# Patient Record
(demographics unavailable — no encounter records)

---

## 2017-01-29 NOTE — US
EXAMINATION TYPE: US transvaginal

 

DATE OF EXAM: 1/27/2017 1:45 PM

 

COMPARISON: NONE

 

CLINICAL HISTORY: N92.1 METRORRHAGIA UNRELATED TO MENSTRUAL CYCLE.

 

TECHNIQUE:  Transvaginal (TV), as bladder was not full and patient stated scheduled for work today an
d chose TV rather than TAUS

 

Date of LMP:  12/26/2016

 

EXAM MEASUREMENTS:

 

Uterus:  8.8 x 6.1 x 4.3cm

Endometrial Stripe: 1.3cm

Right Ovary:  4.3 x 3.7 x 3.6cm

Left Ovary:  2.5 x 2.5 x 1.8cm

 

Findings:

 

1. Uterus:  Anteverted   C Section scar is noted in ROSS. Complex Nabothian cyst is present in CX = 0.
5 x 0.4 x 0.4cm.

2. Endometrium:   unable to correlate thickness with LMP as patient stated has been on menses continu
ally since 12/21/2016  

3. Right Ovary:  enlarged with large simple cyst = 3.5 x 3.0 x 3.2cm 

4. Left Ovary:  multifollicular wit largest = 1.0 x 1.1 x 0.7cm 

**Spectral, color and waveform Doppler imaging shows good arterial and venous flow within the ovaries
; there is no evidence for ovarian torsion.

5. Bilateral Adnexa:  wnl

6. Posterior cul-de-sac:  wnl

 

 

IMPRESSION: 

1. Limited assessment of the endometrium is discussed above. Measures 1.3 cm and appears thickened. C
orrelate clinically for endometrial pathology.

2. Simple appearing right ovarian cyst measuring 3.5 cm.

## 2017-02-07 NOTE — XR
EXAMINATION TYPE: XR foot limited RT

 

DATE OF EXAM: 2/7/2017 6:49 PM

 

COMPARISON: NONE

 

HISTORY: Injury

 

TECHNIQUE: 2 views

 

FINDINGS: I see no fracture nor dislocation. Metatarsals are intact. There is a small Achilles calcan
eal spur. There are no erosions.

 

IMPRESSION: Mild calcaneal spurring. Otherwise negative exam.
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
## 2017-02-07 NOTE — XR
EXAMINATION TYPE: XR chest 2V

 

DATE OF EXAM: 2/7/2017 6:49 PM

 

COMPARISON: 4/14/2016

 

HISTORY: Chest pain

 

TECHNIQUE:  Frontal and lateral views of the chest are obtained.

 

FINDINGS:  Heart and mediastinum are normal. Lungs are clear. Costophrenic angles are clear. There ar
e no hilar masses. Bony thorax is intact.

 

IMPRESSION:  Normal chest. No change.

## 2017-02-17 NOTE — ED
General Adult HPI





- General


Chief complaint: Nausea/Vomiting/Diarrhea


Stated complaint: Nausea


Time Seen by Provider: 17 16:50


Source: patient, RN notes reviewed


Mode of arrival: ambulatory


Limitations: no limitations





- History of Present Illness


Initial comments: 


This is a 39-year-old female who presents with nausea that started last night.  

Patient states she's had 4 episodes of emesis since last night.  Patient states 

her  has the same symptoms at home.  Patient has not had an episode of 

emesis since.  Patient denies any hematemesis or hematochezia or hematuria.  

Patient denies any dysuria.  Patient states she's had a mild case of diarrhea 

once since last night.  Patient denies any abdominal pain.  Patient denies any 

chance of being pregnant as she has had a tubal ligation. Patient denies any 

cough, congestion, or sore throat but admits to a mild headache.  Patient 

attributes her headache to a lack of caffeine today.  Patient has been drinking 

fluids. Patient denies any recent fever, chills, shortness breath, chest pain, 

back pain, numbness, tingling,  hematuria, or visual changes, or any other 

complaints.








- Related Data


 Home Medications











 Medication  Instructions  Recorded  Confirmed


 


ARIPiprazole [Abilify] 2.5 mg PO DAILY 03/03/15 02/07/17


 


Citalopram Hydrobromide [CeleXA] 20 mg PO DAILY 03/03/15 02/07/17


 


Dextroamphetamine/Amphetamine 30 mg PO BID 17





[Adderall]   








 Previous Rx's











 Medication  Instructions  Recorded


 


Ibuprofen [Motrin] 600 mg PO Q6HR PRN #20 tab 17


 


Ondansetron Odt [Zofran Odt] 4 mg PO Q8H 3 Days 17


 


Sulfamethox-Tmp 800-160Mg [Bactrim 1 tab PO Q12HR #28 tab 17





-160 mg]  











 Allergies











Allergy/AdvReac Type Severity Reaction Status Date / Time


 


Penicillins AdvReac  Itching Verified 17 18:21














Review of Systems


ROS Statement: 


Those systems with pertinent positive or pertinent negative responses have been 

documented in the HPI.





ROS Other: All systems not noted in ROS Statement are negative.





Past Medical History


Past Medical History: No Reported History


History of Any Multi-Drug Resistant Organisms: MRSA


Date of last positivie culture/infection: 


MDRO Source:: neck


Past Surgical History:  Section


Past Psychological History: Bipolar, Depression


Smoking Status: Current every day smoker


Past Alcohol Use History: Occasional


Past Drug Use History: None Reported





General Exam





- General Exam Comments


Initial Comments: 


General:  The patient is awake and alert, in no distress, and does not appear 

acutely ill. 


Eye:  Pupils are equal, round and reactive to light, extra-ocular movements are 

intact.  No nystagmus.  There is normal conjunctiva bilaterally.  No signs of 

icterus.  


Ears: TMs pink and pearly with intact cone of light bilaterally.  Normal 

external ear canals


Nose: Nasal turbinates pink and moist


Mouth and throat:  There are moist mucous membranes and no oral lesions. 


Neck:  The neck is supple, there is no tenderness or JVD.  


Cardiovascular:  There is a regular rate and rhythm. No murmur, rub or gallop 

is appreciated.


Respiratory:  Lungs are clear to auscultation, respirations are non-labored, 

breath sounds are equal.  No wheezes, stridor, rales, or rhonchi.


Gastrointestinal: Mild discomfort to palpation of the suprapubic area. Soft, non

-distended, abdomen without masses or organomegaly noted. There is no rebound 

or guarding present.  No CVA tenderness. Bowel sounds are unremarkable.


Musculoskeletal:  Normal ROM, no tenderness.  Strength 5/5. Sensation intact.  

Radial Pulses equal bilaterally 2+.  


Neurological:  A&O x 3. CN II-XII intact, There are no obvious motor or sensory 

deficits. Coordination appears grossly intact. Speech is normal.


Skin:  Skin is warm and dry and no rashes or lesions are noted. 


Psychiatric:  Cooperative, appropriate mood & affect, normal judgment.  





Limitations: no limitations





Course


 Vital Signs











  17





  16:24


 


Temperature 97.2 F L


 


Pulse Rate 76


 


Respiratory 17





Rate 


 


Blood Pressure 132/85


 


O2 Sat by Pulse 97





Oximetry 














Medical Decision Making





- Medical Decision Making


This is an 29yo  female who presents with nausea that started last night.  On 

physical exam patient is afebrile in the EC.  Mild discomfort to palpation of 

the suprapubic area.  Abdomen is soft, nondistended with no CVA tenderness.  No 

rebound guarding or rigidity.  A UA was checked.  An influenza was checked and 

was negative.  Patient was given Zofran in the EC today.  Patient was feeling 

much better after Zofran.  Urinalysis was positive for urinary tract infection.

  Blood was noted in the urine and with further questioning patient states she 

is currently finishing up her menstrual cycle. Patient denies any flank pain or 

back pain.  Discussed that patient would be put on a course of Bactrim.  I 

discussed that patient should drink plenty of fluids.  Discussed return 

parameters.  Tylenol or Motrin for any pain.  Discussed patient will be given a 

prescription for Zofran. Discussed that patient should follow up with PCP in 

one to 2 days or return to the EC for any worsening symptoms or for any further 

concerns.  Patient was receptive to this plan and patient will be discharged 

home.  








- Lab Data


 Lab Results











  17 Range/Units





  17:04 17:04 


 


Urine Color   Yellow  


 


Urine Appearance   Cloudy H  (Clear)  


 


Urine pH   7.0  (5.0-8.0)  


 


Ur Specific Gravity   1.016  (1.001-1.035)  


 


Urine Protein   Negative  (Negative)  


 


Urine Glucose (UA)   Negative  (Negative)  


 


Urine Ketones   Negative  (Negative)  


 


Urine Blood   Moderate H  (Negative)  


 


Urine Nitrate   Negative  (Negative)  


 


Urine Bilirubin   Negative  (Negative)  


 


Urine Urobilinogen   <2.0  (<2.0)  mg/dL


 


Ur Leukocyte Esterase   Large H  (Negative)  


 


Urine RBC   5  (0-5)  /hpf


 


Urine WBC   40 H  (0-5)  /hpf


 


Ur Squamous Epith Cells   9 H  (0-4)  /hpf


 


Urine Sperm   Rare  (None)  /hpf


 


Influenza Type A RNA  Not Detected   (Not Detectd)  


 


Influenza Type B (PCR)  Not Detected   (Not Detectd)  














Disposition


Clinical Impression: 


 Nausea & vomiting, Urinary tract infection





Disposition: HOME SELF-CARE


Condition: Good


Instructions:  Acute Nausea and Vomiting (ED), Urinary Tract Infection in Women 

(ED)


Additional Instructions: 


Please use Zofran as prescribed.  Please finish entire course of antibiotics.  

Please drink plenty of fluids. Please follow-up with family doctor in the next 

2 days of symptoms have not improved.  Please return to emergency room if the 

symptoms increase or worsen or for any other concerns.


Prescriptions: 


Ondansetron Odt [Zofran Odt] 4 mg PO Q8H 3 Days


Sulfamethox-Tmp 800-160Mg [Bactrim -160 mg] 1 tab PO Q12HR #28 tab


Referrals: 


Alejo Reece MD [Primary Care Provider] - 1-2 days


Time of Disposition: 17:58

## 2017-02-28 NOTE — P.HPIM
History of Present Illness


H&P Date: 17


Chief Complaint: Abdominal pain vaginal bleeding heavy menses





A 39-year-old female who was a direct admission from Dr. Scales's office on 

.  Patient was being seen in the office for chief complaint of 

developing abdominal pain bilateral lower quadrants with heavy menses onset 2 

months prior reportedly has become more symptomatic interfering with activities 

of daily life.  Patient stated that she had an vaginal ultrasound done in the 

outpatient setting as part of a workup for the heavy menses this was done in 

2017.  Patient is not certain of what the findings were.  Reviewing 

computerized medical records indicate on 2017 ultrasound vaginally 

showed a simple appearing right ovarian cyst measuring 3.5 cm.  Patient also 

states that she has not had prior episodes of having heavy vaginal.  Patient 

gives no significant past surgical history when questioning.  Additionally 

patient states she has not been to an OB/GYN.  Her last pregnancy was 8 years 

ago according to the patient was uneventful.  Patient states prior to the last 

several months her periods had been normal with a normal vaginal flow patient 

states that she stands up she feels like she could pass clots with a lot of 

vaginal bleeding hemoglobin on admission was 13.





Review of Systems





Essentially unremarkable except as mentioned in the present illness





Past Medical History


Past Medical History: No Reported History


Additional Past Medical History / Comment(s): VAGINAL BLEEDING SINCE , 

ULTRASOUND DONE AND SHOWED CYST


History of Any Multi-Drug Resistant Organisms: MRSA


Date of last positivie culture/infection: 


MDRO Source:: neck


Past Surgical History:  Section


Past Anesthesia/Blood Transfusion Reactions: No Reported Reaction


Past Psychological History: Bipolar, Depression


Smoking Status: Current every day smoker


Past Alcohol Use History: Occasional


Past Drug Use History: None Reported





Medications and Allergies


 Home Medications











 Medication  Instructions  Recorded  Confirmed  Type


 


ARIPiprazole [Abilify] 2.5 mg PO HS 03/03/15 02/27/17 History


 


Citalopram Hydrobromide [CeleXA] 20 mg PO BID 03/03/15 02/27/17 History


 


Dextroamphetamine/Amphetamine 30 mg PO BID 17 History





[Adderall]    











 Allergies











Allergy/AdvReac Type Severity Reaction Status Date / Time


 


Penicillins Allergy  Itching Verified 17 20:11














Physical Exam


Vitals: 


 Vital Signs











  Temp Pulse Resp BP Pulse Ox


 


 17 12:29   59 L  18  


 


 17 08:00   59 L  18  


 


 17 07:42  97.6 F  59 L  18  107/70  96


 


 17 05:29    16  


 


 17 03:55  97.8 F  53 L  16  114/68  96


 


 17 00:00    16  


 


 17 19:53  98.3 F  81  16  124/71  97


 


 17 15:51  97.8 F  81  18  137/100  100








 Intake and Output











 17





 06:59 14:59 22:59


 


Intake Total  480 


 


Balance  480 


 


Intake:   


 


  Oral  480 


 


Other:   


 


  # Voids 1 2 














GENERAL APPEARANCE: 39-year-old female patient is alert, oriented, in no acute 

distress.  Resting in bed appears well-hydrated nontoxic


VITAL SIGNS: Reviewed afebrile


HEENT: Head is normocephalic and atraumatic. Pupils are equal and reactive. The 

nares are patent. Oropharynx is clear without lesions.


NECK: Supple without lymphadenopathy. Traches midline.


HEART: S1, S2. Regular rate and rhythm.  No murmur noted denying chest pain


LUNGS: No crackles or wheezes are heard.  Adequate air movement bilaterally on 

room air sats are 96% no cough


ABDOMEN: Soft, obese nontender, nondistended with good bowel sounds. No 

peritoneal signs. No palpable organomegaly or masses.  No facial grimacing with 

palpitation to the abdominal wall


Genitals no vaginal bleeding noted no swelling noted no vaginal discharge noted


EXTREMITIES: Normal skin color and turgor. No cyanosis, rash, ulceration, 

clubbing or edema. Radial pedal pulses are 2/4 bilaterally.


NEUROLOGICAL: No focal deficits. Strength and sensation are grossly intact.








Results


CBC & Chem 7: 


 17 09:01





 17 09:01


Labs: 


 Abnormal Lab Results - Last 24 Hours (Table)











  17 Range/Units





  09:01 09:01 


 


RBC  3.76 L   (3.80-5.40)  m/uL


 


Potassium   3.2 L  (3.5-5.1)  mmol/L


 


Glucose   125 H  (74-99)  mg/dL


 


Calcium   8.1 L  (8.4-10.2)  mg/dL


 


Total Protein   5.7 L  (6.3-8.2)  g/dL


 


Albumin   3.2 L  (3.5-5.0)  g/dL














Thrombosis Risk Factor Assmnt





- Choose All That Apply


Any of the Below Risk Factors Present?: No


Other Risk Factors: No


Other congenital or acquired thrombophilia - If yes, enter type in comment: No


Thrombosis Risk Factor Assessment Level: Very Low Risk





Assessment and Plan


Plan: 





Impression


Present on admission bilateral lower abdominal pain with vaginal bleeding 

unclear etiology


Depressive anxiety disorder nonspecified


A mood disorder suspect bipolar


2017 vaginal ultrasound done showing a simple appearing right ovarian 

cyst measuring 3.5 cm


Current Every day smoker


Obesity BMI 32











Plan


Surgical consultation pending


OB/GYN vaginal bleeding pending


Resume home meds as appropriate


Follow up on the ultrasound of the abdomen pending


Continue with the nicotine patch as ordered


Patient's been advised to stop smoking cigarettes


Further recommendations pending

















The above dictated assessment and findings were discussed with dr yordy Vargas and the plan of care have been dictated as directed.  Jeane Sanabria 

nurse practitioner acting as a scribe for dr scales

## 2017-02-28 NOTE — US
EXAMINATION TYPE: US abdomen complete

 

DATE OF EXAM: 2/28/2017 3:47 PM

 

COMPARISON: NONE

 

CLINICAL HISTORY: 39-year-old female with abdominal pain.

 

TECHNIQUE: Multiple sonographic images of the abdomen were obtained.

 

FINDINGS:

 

Liver Length:  17.8 cm   

Gallbladder Wall:  0.3 cm   

CBD: 4.5 mm

Spleen:  13.9 cm   

Right Kidney:  11.3 x 5.2 x 4.5  cm 

Left Kidney:  11.0 x 5.6 x 5.7 cm   

 

Pancreas:  echogenic, tail not seen due to overlying bowel gas, main pancreatic duct- 2.2 mm

Liver: Mildly enlarged and somewhat echogenic. No focal lesion seen.

Gallbladder:  No abnormal gallbladder distention, wall thickening, pericholecystic fluid, or shadowin
g calculi.

**Evidence for sonographic Brambila's sign:  neg

CHD:  Within normal limits. 

Spleen:  Borderline enlarged.   

Right Kidney: There is mild pelvicaliectasis.

Left Kidney:  There is mild pelvicaliectasis.

Upper IVC: Within normal limits.

Abd Aorta:  mid Aorta not seen due to overlying bowel gas

 

 

 

IMPRESSION: 

1. Mild hepatomegaly and suspected mild hepatic steatosis. Correlate with LFTs, lipid profile, and pa
tient risk factors.

2. Mild bilateral pelvicaliectasis/hydronephrosis.

## 2017-02-28 NOTE — US
EXAMINATION TYPE: US pelvis complete transvag

 

DATE OF EXAM: 2017 5:19 PM

 

COMPARISON: on PACS

 

CLINICAL HISTORY: 39-year-old female with abdominal pain and history of a right ovarian simple cyst, 
irregular bleeding.

 

Date of LMP:  2016,  

 

TECHNIQUE:  Transvaginal (TV) and Transabdominal (TA) 

 

FINDINGS:

Uterus:  Anteverted measuring 9.0 x 4.4 x 3.9 cm. A 6 mm cervical nabothian cyst is demonstrated.

 

Endometrial Stripe: 0.9 cm, within normal limits.

 

Right Ovary:  4.1 x 2.5 x 2.1 cm for a volume of 11.0 mL with follicular change.

 

Left Ovary:  3.0 x 2.0 x 1.6 cm for a volume of 5.0 mL with follicular change.

 

There is trace right adnexal free fluid adjacent to the ovary.

 

Otherwise, no evident adnexal abnormality or cul-de-sac free fluid.

 

 

 

IMPRESSION: 

1. Endometrial stripe now measuring 9 mm, within normal limits.

2. Follicular change in both ovaries. Previous 3.5 cm right ovarian cyst has resolved.

3. Trace pelvic free fluid adjacent to the right ovary likely physiologic.

## 2017-02-28 NOTE — P.GSCN
History of Present Illness


Consult date: 17


Reason for Consult: 


Abdominal pain





Requesting physician: Alejo Reece


History of present illness: 


Patient is a 39-year-old  female referred from Dr. Alejo Reece for 

abdominal pain.  Patient states that she's had right lower quadrant pain for 

the last 3 months increased in intensity over the last 4-5 days.  Patient 

describes pain as sharp, intermittent, and radiating to her back.  Patient 

currently rates pain 6 out of 10.  Patient also reports abnormal vaginal 

bleeding 3 months.  Patient states that she underwent a transvaginal 

ultrasound and Dr. Reece's office on  that showed evidence of a 

right ovarian cyst with evidence of thickened lining of the uterus.  Patient 

was instructed to follow-up with OB/GYN but states she has been unable to book 

an appointment.  Patient denies recent fevers, chills, nausea, vomiting, 

shortness of breath, or chest pain.  Patient states she has had 2 C-sections 

and tubal ligation in the past.  Patient denies constipation or diarrhea.  No 

history of melena or hematochezia.  Afebrile. Blood pressure 107/70.  No 

evidence of leukocytosis.  Hemoglobin stable at 11.6.








Past Medical History


Past Medical History: No Reported History


Additional Past Medical History / Comment(s): VAGINAL BLEEDING SINCE , 

ULTRASOUND DONE AND SHOWED CYST


History of Any Multi-Drug Resistant Organisms: MRSA


Year Discovered:: 


MDRO Source:: neck


Past Surgical History:  Section


Past Anesthesia/Blood Transfusion Reactions: No Reported Reaction


Past Psychological History: Bipolar, Depression


Smoking Status: Current every day smoker


Past Alcohol Use History: Occasional


Past Drug Use History: None Reported





Medications and Allergies


 Home Medications











 Medication  Instructions  Recorded  Confirmed  Type


 


ARIPiprazole [Abilify] 2.5 mg PO HS 03/03/15 02/27/17 History


 


Citalopram Hydrobromide [CeleXA] 20 mg PO BID 03/03/15 02/27/17 History


 


Dextroamphetamine/Amphetamine 30 mg PO BID 17 History





[Adderall]    











 Allergies











Allergy/AdvReac Type Severity Reaction Status Date / Time


 


Penicillins Allergy  Itching Verified 17 20:11














Surgical - Exam


 Vital Signs











Temp Pulse Resp BP Pulse Ox


 


 97.8 F   81   18   137/100   100 


 


 17 15:51  17 15:51  17 15:51  17 15:51  17 15:51











GENERAL: Pt awake and alert, lying in bed, well-nourished, and in no acute 

distress.


HEAD: Atraumatic, normocephalic.


EYES: Pupils equal and round.  Sclera anicteric, conjunctiva are normal.


ENT: Moist mucous membranes.


NECK: Supple without lymphadenopathy or JVD. 


LUNGS: Breath sounds clear to auscultation bilaterally.  No wheezes, rales, or 

rhonchi.


HEART: Heart S1, S2, no S3 or S4.  Regular rate and rhythm.  No murmurs, rubs 

or gallops.


ABDOMEN: Soft, right lower quadrant tenderness, nondistended, normoactive bowel 

sounds.  No peritoneal signs. 


EXTREMITIES: Palpable peripheral pulses. No edema. No calf tenderness.


NEUROLOGICAL: Pt oriented x 3.  No focal deficits.  Strength and sensation 

grossly intact.


PSYCH: Normal mood, normal affect.


SKIN: Warm, dry, intact. Normal turgor. 








Results





- Labs





 17 09:01





 17 09:01


 Abnormal Lab Results - Last 24 Hours (Table)











  17 Range/Units





  09:01 09:01 


 


RBC  3.76 L   (3.80-5.40)  m/uL


 


Potassium   3.2 L  (3.5-5.1)  mmol/L


 


Glucose   125 H  (74-99)  mg/dL


 


Calcium   8.1 L  (8.4-10.2)  mg/dL


 


Total Protein   5.7 L  (6.3-8.2)  g/dL


 


Albumin   3.2 L  (3.5-5.0)  g/dL








 Diabetes panel











  17 Range/Units





  16:20 09:01 


 


Sodium  142  139  (137-145)  mmol/L


 


Potassium  4.0  3.2 L  (3.5-5.1)  mmol/L


 


Chloride  104  104  ()  mmol/L


 


Carbon Dioxide  28  26  (22-30)  mmol/L


 


BUN  14  14  (7-17)  mg/dL


 


Creatinine  0.61  0.56  (0.52-1.04)  mg/dL


 


Glucose  78  125 H  (74-99)  mg/dL


 


Calcium  9.2  8.1 L  (8.4-10.2)  mg/dL


 


AST   22  (14-36)  U/L


 


ALT   34  (9-52)  U/L


 


Alkaline Phosphatase   49  ()  U/L


 


Total Protein   5.7 L  (6.3-8.2)  g/dL


 


Albumin   3.2 L  (3.5-5.0)  g/dL








 Calcium panel











  17 Range/Units





  16:20 09:01 


 


Calcium  9.2  8.1 L  (8.4-10.2)  mg/dL


 


Albumin   3.2 L  (3.5-5.0)  g/dL








 Pituitary panel











  17 Range/Units





  16:20 09:01 


 


Sodium  142  139  (137-145)  mmol/L


 


Potassium  4.0  3.2 L  (3.5-5.1)  mmol/L


 


Chloride  104  104  ()  mmol/L


 


Carbon Dioxide  28  26  (22-30)  mmol/L


 


BUN  14  14  (7-17)  mg/dL


 


Creatinine  0.61  0.56  (0.52-1.04)  mg/dL


 


Glucose  78  125 H  (74-99)  mg/dL


 


Calcium  9.2  8.1 L  (8.4-10.2)  mg/dL








 Adrenal panel











  17 Range/Units





  16:20 09:01 


 


Sodium  142  139  (137-145)  mmol/L


 


Potassium  4.0  3.2 L  (3.5-5.1)  mmol/L


 


Chloride  104  104  ()  mmol/L


 


Carbon Dioxide  28  26  (22-30)  mmol/L


 


BUN  14  14  (7-17)  mg/dL


 


Creatinine  0.61  0.56  (0.52-1.04)  mg/dL


 


Glucose  78  125 H  (74-99)  mg/dL


 


Calcium  9.2  8.1 L  (8.4-10.2)  mg/dL


 


Total Bilirubin   0.3  (0.2-1.3)  mg/dL


 


AST   22  (14-36)  U/L


 


ALT   34  (9-52)  U/L


 


Alkaline Phosphatase   49  ()  U/L


 


Total Protein   5.7 L  (6.3-8.2)  g/dL


 


Albumin   3.2 L  (3.5-5.0)  g/dL














Assessment and Plan


Plan: 


Impression:





1.  Right lower quadrant abdominal pain.


2.  History of dysmenorrhea for 3 months.


3.  History of right ovarian cyst per patient history.





Plan:





1.  Continue to monitor patient.  Await OB/GYN input.  Patient is on a surgical 

candidate at this time.





The above impression and plan have been discussed and directed by Dr. Schwarz. 

Gian HOOVER acting as scribe for Dr. Schwarz.

## 2017-02-28 NOTE — P.PN
Subjective





39-year-old female being seen in the observation unit.  Patient was a direct 

admission from Dr. Scales's office on the 27th.  Patient states is being seen 

in the office for "intense abdominal pain points to the bilateral lower abdomen 

" also having heavy menses with blood clots onset for the past several months" 

patient denies any prior episodes.  States that she was seen in the office and 

advised to be admitted and worked up for the above-mentioned symptoms.  

Currently the 


PeriPad  is dry no evidence of vaginal bleeding.  Patient states the vaginal 

bleeding occurs when she stands up passing clots causing intense cramping 

interfering with work" hemoglobin on admission 13 repeat hemoglobin 11.6





 patient also states that she had an ultrasound within the last month for the 

above-mentioned symptoms.  Patient has poor past medical history recall.  

Patient's past medical history significant for bipolar depressive disorder.  

Patient states the pain in her abdomen is unbearable.  Reviewing computerized 

record indicates that in January 27th 2017 patient did have an ultrasound 

reviewing the report it showed a simple appearing right ovarian cyst measuring 

3.5 cm and the limited assessment of the endometnium








Objective





- Vital Signs


Vital signs: 


 Vital Signs











Temp  97.6 F   02/28/17 07:42


 


Pulse  59 L  02/28/17 12:29


 


Resp  18   02/28/17 12:29


 


BP  107/70   02/28/17 07:42


 


Pulse Ox  96   02/28/17 07:42








 Intake & Output











 02/27/17 02/28/17 02/28/17





 18:59 06:59 18:59


 


Intake Total   480


 


Balance   480


 


Weight 85.5 kg  


 


Intake:   


 


  Oral   480


 


Other:   


 


  # Voids  1 2














- Exam





GENERAL APPEARANCE: 39-year-old female patient is alert, oriented, in no acute 

distress.


VITAL SIGNS: Reviewed 


HEENT: Head is normocephalic and atraumatic. Pupils are equal and reactive. The 

nares are patent. Oropharynx is clear without lesions.


NECK: Supple without lymphadenopathy. Traches midline.


HEART: S1, S2. Regular rate and rhythm.  Denying chest pain


LUNGS: No crackles or wheezes are heard.  Adequate air movement


ABDOMEN: Soft, nontender, nondistended with good bowel sounds. No peritoneal 

signs. No palpable organomegaly or masses.  No active vaginal bleeding noted no 

frequent stooling denies any burning on urination not able to elicit any facial 

grimacing with palpitation to the abdominal wall


EXTREMITIES: Normal skin color and turgor. No cyanosis, rash, ulceration, 

clubbing or edema. Radial pedal pulses are 2/4 bilaterally.


NEUROLOGICAL: No focal deficits. Strength and sensation are grossly intact.








- Labs


CBC & Chem 7: 


 02/28/17 09:01





 02/28/17 09:01


Labs: 


 Abnormal Lab Results - Last 24 Hours (Table)











  02/28/17 02/28/17 Range/Units





  09:01 09:01 


 


RBC  3.76 L   (3.80-5.40)  m/uL


 


Potassium   3.2 L  (3.5-5.1)  mmol/L


 


Glucose   125 H  (74-99)  mg/dL


 


Calcium   8.1 L  (8.4-10.2)  mg/dL


 


Total Protein   5.7 L  (6.3-8.2)  g/dL


 


Albumin   3.2 L  (3.5-5.0)  g/dL














Assessment and Plan


Plan: 





Impression


Present on admission abdominal pain with vaginal bleeding unclear etiology


Depressive anxiety disorder nonspecified


A mood disorder suspect bipolar


01/27/2017 vaginal ultrasound done showing a simple appearing right ovarian 

cyst measuring 3.5 cm


Current Every day smoker


Obesity BMI 32











Plan


Surgical consultation pending


OB/GYN vaginal bleeding pending


Resume home meds as appropriate


Follow up on the ultrasound of the abdomen pending


Continue with the nicotine patch as ordered


Patient's been advised to stop smoking cigarettes


Further recommendations pending

















The above dictated assessment and findings were discussed with dr yordy Vargas and the plan of care have been dictated as directed.  Jeane Sanabria 

nurse practitioner acting as a scribe for dr scales

## 2017-03-01 NOTE — P.DS
Providers


Date of admission: 


02/27/17 15:27





Expected date of discharge: 03/01/17


Attending physician: 


Alejo Reece





Consults: 





 





02/27/17 21:49


Consult Physician Routine 


   Consulting Provider: Tahira Hernández


   Consult Reason/Comments: Pain and prolonged vaginal bleeding.


   Do you want consulting provider notified?: Yes, Notify in am





02/27/17 23:54


Consult Physician Routine 


   Consulting Provider: Pantera Schwarz


   Consult Reason/Comments: abdominal pain


   Do you want consulting provider notified?: Yes, Notify in am











[facility]: 


[facility]





[facility] Course: 





A 39-year-old female who was a direct admission from Dr. Reece's office on 

February 27.  Patient was being seen in the office for chief complaint of 

developing abdominal pain bilateral lower quadrants with heavy menses onset 2 

months prior reportedly has become more symptomatic interfering with activities 

of daily life.  Patient stated that she had an vaginal ultrasound done in the 

outpatient setting as part of a workup for the heavy menses this was done in 

January 2017.  Patient is not certain of what the findings were.  Reviewing 

computerized medical records indicate on January 27 2017 ultrasound vaginally 

showed a simple appearing right ovarian cyst measuring 3.5 cm.  Patient also 

states that she has not had prior episodes of having heavy vaginal.  Patient 

gives no significant past surgical history when questioning.  Additionally 

patient states she has not been to an OB/GYN.  Her last pregnancy was 8 years 

ago according to the patient was uneventful.  Patient states prior to the last 

several months her periods had been normal with a normal vaginal flow patient 

states that she stands up she feels like she could pass clots with a lot of 

vaginal bleeding hemoglobin on admission was 13.


A surgical consultation was requested who indicated there was no surgical 

intervention at this time a nonsurgical abdomen


OB/GYN patient was seen by Dr. Hernández reviewed the pelvic ultrasound small amount 

of free fluid near the right ovary no ovarian cyst.  Patient's indicating the 

right lower quadrant pain is better than when she came in but not gone.  The 

recommending that she call the office the OB/GYN make an appointment OB/GYN 

advised patient to schedule yearly appointment and neuropathy they'll go over 

her history and possibly schedule an outpatient D&C if she continues to have 

bleeding.  There is recommending the patient be on Provera this will hopefully 

get her cycles back on track.  Patient was felt to be hemodynamically stable 

and appropriate proceed with a discharge to home











Impression discharge diagnosis


Menorrhagia with irregular cycle present on admission pelvic pain acute


Present on admission bilateral lower abdominal pain with vaginal bleeding .


Depressive anxiety disorder nonspecified


A mood disorder suspect bipolar


01/27/2017 vaginal ultrasound done showing a simple appearing right ovarian 

cyst measuring 3.5 cm


Current Every day smoker


Obesity BMI 32








The above dictated assessment and findings were discussed with Dr. Reece  

Impression and the plan of care have been dictated as directed.  Jeane Sanabria 

nurse practitioner acting as a scribe for Dr. Reece





Plan - Discharge Summary


New Discharge Prescriptions: 


medroxyPROGESTERone [Provera] 10 mg PO DAILY #40 tablet


Discharge Medication List





ARIPiprazole [Abilify] 2.5 mg PO HS 03/03/15 [History]


Citalopram Hydrobromide [CeleXA] 20 mg PO BID 03/03/15 [History]


Dextroamphetamine/Amphetamine [Adderall] 30 mg PO BID 02/07/17 [History]


medroxyPROGESTERone [Provera] 10 mg PO DAILY #40 tablet 03/01/17 [Rx]








Follow up Appointment(s)/Referral(s): 


Tahira Hernández DO [Doctor of Osteopathic Medicine] - 4 Weeks


Alejo Reece MD [Primary Care Provider] - 1 Week


Activity/Diet/Wound Care/Special Instructions: 


Smoking cessation information to be provided patient's been advised to stop 

smoking cigarettes


Discharge Disposition: HOME SELF-CARE

## 2017-03-01 NOTE — P.OBCN
History of Present Illness


Consult date: 17


Requesting physician: Alejo Reece


Reason for consult: menorrhagia, pelvic pain


Chief complaint: Prolonged vaginal bleeding, right lower quadrant pain


History of present illness: 





This is a 39-year-old female  7 para 5025 who presented with complaints 

of worsening pelvic pain more so on the right side and continued vaginal 

bleeding.  She states her vaginal bleeding started approximate 3 months ago and 

never stopped.  She states she has bleeding all the time and its worse when she 

goes from lying down to sitting up and then she gets gushes of blood.  She 

states this has slowed down over the last week.  She has been bleeding since 

approximate November.  She stated her right lower quadrant abdominal pain began 

approximately a month ago and was worse over the last week.  It is better now 

that she is laying in the bed in the hospital, but still present.  She did have 

an ultrasound approximate month ago that showed a ovarian cyst on her right 

ovary measuring approximately 3.5 cm with simple appearance.  Her endometrial 

thickness at that time was 1.3 cm which was slightly thickened.  Otherwise her 

uterus and her left tube and ovary looked normal.  Her current ultrasound done 

yesterday showed no ovarian cysts and a small amount of fluid around the right 

ovary along with a normal endometrial thickness of 0.9 cm.





Obstetrical history: .  She has a history of 3 vaginal deliveries 

followed by 2  sections.  She did have a tubal ligation with her last 

 section.


Gynecologic history: No history of sexually transmitted diseases.  She states 

her last Pap smear was normal and at least 2 or 3 years ago.  She does have a 

history of an abnormal Pap smear many years ago but a biopsy was done and was 

benign.  She has had a tubal ligation.


Social history: She is single and states she had a boyfriend but she thinks he 

left her while she was in the hospital.  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





Review of Systems


Gastrointestinal: Reports abdominal pain, Denies nausea, Denies vomiting


Genitourinary: Reports abnormal vaginal bleeding, Reports menorrhagia, Reports 

pelvic pain (Right lower quadrant)


Menstruation: Reports menses 8 or > days, Reports period heavy





Past Medical History


Past Medical History: No Reported History


Additional Past Medical History / Comment(s): VAGINAL BLEEDING SINCE 16, 

ULTRASOUND DONE AND SHOWED CYST


History of Any Multi-Drug Resistant Organisms: MRSA


Year Discovered:: 


MDRO Source:: neck


Past Surgical History:  Section (2), Tubal Ligation


Past Anesthesia/Blood Transfusion Reactions: No Reported Reaction


Past Psychological History: Bipolar, Depression


Smoking Status: Current every day smoker


Past Alcohol Use History: Occasional


Past Drug Use History: None Reported





- Past Family History


  ** Mother


Additional Family Medical History / Comment(s): Schizophrenia





Medications and Allergies


 Home Medications











 Medication  Instructions  Recorded  Confirmed  Type


 


ARIPiprazole [Abilify] 2.5 mg PO HS 03/03/15 02/27/17 History


 


Citalopram Hydrobromide [CeleXA] 20 mg PO BID 03/03/15 02/27/17 History


 


Dextroamphetamine/Amphetamine 30 mg PO BID 17 History





[Adderall]    











 Allergies











Allergy/AdvReac Type Severity Reaction Status Date / Time


 


Penicillins Allergy  Itching Verified 17 20:11














Exam


Osteopathic Statement: *.  No significant issues noted on an osteopathic 

structural exam other than those noted in the History and Physical/Consult.





- Vital Signs


Vital signs: 


 Vital Signs











  Temp Pulse Resp BP BP Pulse Ox


 


 17 04:00  98.1 F  57 L  16  134/79   96


 


 17 00:00    16   


 


 17 20:00    16   


 


 17 19:32  98.0 F  57 L  16   94/56  97


 


 17 16:00  98.0 F  57 L  18   138/85  97


 


 17 12:29   59 L  18   


 


 17 08:00   59 L  18   








 Intake and Output











 17





 22:59 06:59 14:59


 


Intake Total 480  


 


Balance 480  


 


Intake:   


 


  Oral 480  


 


Other:   


 


  # Voids 1 1 


 


  Weight  85.5 kg 














- OBG Physical Exam


Abdomen: no diffuse tenderness, no mass


Abdomen detail: right lower quadrant: tenderness (Mild tenderness noted in the 

right lower quadrant)





Pelvic exam is deferred to the office due to recent ultrasound findings.  Cherie-

pad shows very scant blood.





Results


Result Diagrams: 


 17 06:41





 17 06:41


 Abnormal Lab Results - Last 24 Hours (Table)











  17 Range/Units





  09:01 09:01 06:41 


 


RBC  3.76 L    (3.80-5.40)  m/uL


 


Potassium   3.2 L   (3.5-5.1)  mmol/L


 


Chloride    108 H  ()  mmol/L


 


Glucose   125 H   (74-99)  mg/dL


 


Calcium   8.1 L   (8.4-10.2)  mg/dL


 


Total Protein   5.7 L  5.7 L  (6.3-8.2)  g/dL


 


Albumin   3.2 L  3.1 L  (3.5-5.0)  g/dL











Comments: 





Pelvic ultrasound shows uterus measuring 9.0 x 4.4 x 3.9 cm with endometrial 

thickness of 0.9 cm.  No ovarian cysts were noted.  There was a small amount of 

free fluid near the right ovary.





Assessment and Plan


(1) Menorrhagia with irregular cycle


Status: Acute   





(2) Pelvic pain


Status: Acute   


Plan: 





The patient was advised that the recent ultrasound findings.  She states her 

right lower quadrant pain is better than it was when she came in but not gone.  

She stated it is tolerable at this point.  I have advised her that I can give 

her Provera 10 mg daily for 10 days to stop her bleeding.  She is aware that 

she will have another period after she and the Provera, however this will 

hopefully get her cycles back on track.  There is no need for surgery at this 

point from a gynecologic standpoint I have advised her to schedule a yearly 

appointment in my office and we will go over her whole history and possibly 

schedule an outpatient dilation and curettage if she continues to have 

bleeding.  She did state she called my office earlier to make an appointment 

but did not make an appointment because she could not get in until April.  I 

have advised her to still make her appointment for her yearly in April and keep 

it bleeding diary in the meantime.  Thank very much for this consultation.